# Patient Record
Sex: MALE | Race: WHITE | NOT HISPANIC OR LATINO | Employment: OTHER | URBAN - METROPOLITAN AREA
[De-identification: names, ages, dates, MRNs, and addresses within clinical notes are randomized per-mention and may not be internally consistent; named-entity substitution may affect disease eponyms.]

---

## 2018-09-10 ENCOUNTER — HOSPITAL ENCOUNTER (OUTPATIENT)
Facility: OTHER | Age: 73
Discharge: HOME | End: 2018-09-10
Attending: INTERNAL MEDICINE | Admitting: INTERNAL MEDICINE
Payer: MEDICARE

## 2018-09-10 VITALS
HEART RATE: 67 BPM | WEIGHT: 270 LBS | RESPIRATION RATE: 16 BRPM | SYSTOLIC BLOOD PRESSURE: 147 MMHG | DIASTOLIC BLOOD PRESSURE: 84 MMHG | TEMPERATURE: 98 F | OXYGEN SATURATION: 98 % | HEIGHT: 66 IN | BODY MASS INDEX: 43.39 KG/M2

## 2018-09-10 DIAGNOSIS — N18.6 ESRD (END STAGE RENAL DISEASE): ICD-10-CM

## 2018-09-10 PROCEDURE — 25000003 PHARM REV CODE 250

## 2018-09-10 PROCEDURE — 36589 REMOVAL TUNNELED CV CATH: CPT | Performed by: INTERNAL MEDICINE

## 2018-09-10 RX ORDER — LIDOCAINE HYDROCHLORIDE 10 MG/ML
INJECTION INFILTRATION; PERINEURAL
Status: DISCONTINUED | OUTPATIENT
Start: 2018-09-10 | End: 2018-09-13 | Stop reason: HOSPADM

## 2018-09-10 NOTE — H&P
[facility]  History & Physical    Subjective:      Chief Complaint/Reason for Admission: Here for TDC removal    Gilson Owens is a 73 y.o. male with ESRD (on HD TTS at [facility] with Dr. Jarquin) who presents today for tunneled dialysis catheter removal.  He has been using his RUE AVF for 2 weeks now without any issues.  Denies recent prolonged bleeding or issues with alarms.      Past Medical History:   Diagnosis Date    Diabetes mellitus     Hypertension     Kidney disease, chronic, end stage on dialysis     Sleep apnea      History reviewed. No pertinent surgical history.  History reviewed. No pertinent family history.  Social History     Tobacco Use    Smoking status: Former Smoker    Smokeless tobacco: Never Used   Substance Use Topics    Alcohol use: Yes     Alcohol/week: 1.2 oz     Types: 2 Glasses of wine per week     Comment: occasionally    Drug use: No       PTA Medications   Medication Sig    aspirin (ECOTRIN) 81 MG EC tablet Take 81 mg by mouth once daily.    clopidogrel (PLAVIX) 75 mg tablet Take 75 mg by mouth once daily.    doxazosin (CARDURA) 4 MG tablet Take 4 mg by mouth nightly.    furosemide (LASIX) 40 MG tablet Take 40 mg by mouth 2 (two) times daily.    insulin glargine (LANTUS) 100 unit/mL injection Inject into the skin every evening.    metoprolol tartrate (LOPRESSOR) 100 MG tablet Take 100 mg by mouth 2 (two) times daily.    ramipril (ALTACE) 1.25 MG capsule Take 1.25 mg by mouth once daily.    simvastatin (ZOCOR) 10 MG tablet Take 10 mg by mouth once daily.    glyBURIDE (DIABETA) 5 MG tablet Take 5 mg by mouth daily with breakfast.    hydroCHLOROthiazide (HYDRODIURIL) 25 MG tablet TAKE 1 TABLET BY MOUTH AS DIRECTED    NIFEdipine (ADALAT CC) 60 MG TbSR Take 60 mg by mouth 2 (two) times daily.    paricalcitol (ZEMPLAR) 1 MCG capsule Take 1 mcg by mouth once daily.    valsartan (DIOVAN) 80 MG tablet Take 80 mg by mouth once daily.     Review of  patient's allergies indicates:   Allergen Reactions    Shrimp         Review of Systems   Constitutional: Negative.    Respiratory: Negative.    Cardiovascular: Negative.        Objective:      Vital Signs (Most Recent)  Temp: 97.4 °F (36.3 °C) (09/10/18 0935)  Pulse: 96 (09/10/18 0935)  Resp: 14 (09/10/18 0935)  BP: (!) 161/72 (09/10/18 0935)  SpO2: 99 % (09/10/18 0935)    Vital Signs Range (Last 24H):  Temp:  [97.4 °F (36.3 °C)]   Pulse:  [96]   Resp:  [14]   BP: (161)/(72)   SpO2:  [99 %]     Physical Exam   Constitutional: He is oriented to person, place, and time. He appears well-developed and well-nourished. No distress.   HENT:   Head: Normocephalic and atraumatic.   Eyes: EOM are normal.   Neck: Neck supple.   Cardiovascular:   R IJ tunneled dialysis catheter, clean without erythema or exudate   Pulmonary/Chest: Effort normal. No respiratory distress.   Musculoskeletal:   RUE AVF with good thrill, non-pulsatile   Neurological: He is alert and oriented to person, place, and time.   Skin: Skin is warm and dry. He is not diaphoretic.   Psychiatric: He has a normal mood and affect. His behavior is normal.       Assessment:      There are no hospital problems to display for this patient.      Plan:      TDC removal today.  Risks explained, consent signed.

## 2018-09-10 NOTE — BRIEF OP NOTE
[facility]  Brief Operative Note     SUMMARY     Surgery Date: 9/10/2018     Surgeon(s) and Role:     * Ahsan Mendoza MD - Primary    Assisting Surgeon: None    Pre-op Diagnosis:  ESRD (end stage renal disease) [N18.6]    Post-op Diagnosis:  Post-Op Diagnosis Codes:     * ESRD (end stage renal disease) [N18.6]    Procedure(s) (LRB):  REMOVAL, CATHETER, CENTRAL VENOUS, TUNNELED (N/A)    Anesthesia: 1% lidocaine    Description of the findings of the procedure: Patient was prepped and draped in a sterile fashion.  This was a successful removal of his R IJ TDC.  Patient tolerated the procedure well and no immediate complications noted.    Findings/Key Components: Patient was prepped and draped in a sterile fashion.  This was a successful removal of his R IJ TDC.  Patient tolerated the procedure well and no immediate complications noted.      Estimated Blood Loss: < 10 mL         Specimens:   Specimen (12h ago, onward)    None          Discharge Note    SUMMARY     Admit Date: 9/10/2018    Discharge Date and Time:  09/10/2018 10:27 AM    Hospital Course (synopsis of major diagnoses, care, treatment, and services provided during the course of the hospital stay): Patient was prepped and draped in a sterile fashion.  This was a successful removal of his R IJ TDC.  Patient tolerated the procedure well and no immediate complications noted.       Final Diagnosis: Post-Op Diagnosis Codes:     * ESRD (end stage renal disease) [N18.6]    Disposition: Home or Self Care    Follow Up/Patient Instructions:     Medications:  Reconciled Home Medications:      Medication List      CONTINUE taking these medications    aspirin 81 MG EC tablet  Commonly known as:  ECOTRIN  Take 81 mg by mouth once daily.     clopidogrel 75 mg tablet  Commonly known as:  PLAVIX  Take 75 mg by mouth once daily.     doxazosin 4 MG tablet  Commonly known as:  CARDURA  Take 4 mg by mouth nightly.     furosemide 40 MG tablet  Commonly known  as:  LASIX  Take 40 mg by mouth 2 (two) times daily.     glyBURIDE 5 MG tablet  Commonly known as:  DIABETA  Take 5 mg by mouth daily with breakfast.     hydroCHLOROthiazide 25 MG tablet  Commonly known as:  HYDRODIURIL  TAKE 1 TABLET BY MOUTH AS DIRECTED     insulin glargine 100 unit/mL injection  Commonly known as:  LANTUS  Inject into the skin every evening.     metoprolol tartrate 100 MG tablet  Commonly known as:  LOPRESSOR  Take 100 mg by mouth 2 (two) times daily.     NIFEdipine 60 MG Tbsr  Commonly known as:  ADALAT CC  Take 60 mg by mouth 2 (two) times daily.     paricalcitol 1 MCG capsule  Commonly known as:  ZEMPLAR  Take 1 mcg by mouth once daily.     ramipril 1.25 MG capsule  Commonly known as:  ALTACE  Take 1.25 mg by mouth once daily.     simvastatin 10 MG tablet  Commonly known as:  ZOCOR  Take 10 mg by mouth once daily.     valsartan 80 MG tablet  Commonly known as:  DIOVAN  Take 80 mg by mouth once daily.          Discharge Procedure Orders   Call MD for:  temperature >100.4     Call MD for:  severe uncontrolled pain     Call MD for:  redness, tenderness, or signs of infection (pain, swelling, redness, odor or green/yellow discharge around incision site)     Call MD for:   Order Comments: Bleeding from the access site.     Activity as tolerated     Shower on day dressing removed (No bath)     Follow-up Information     [facility] In 1 day.    Why:  for dialysis  Contact information:  Cailin Multani  [facility] [id number] [phone]

## 2018-09-10 NOTE — PLAN OF CARE
D/c instructions given, pt able to ambulate with cane   to elevator , friend with pt . No complaints or distress

## 2018-11-12 ENCOUNTER — TELEPHONE (OUTPATIENT)
Dept: TRANSPLANT | Facility: CLINIC | Age: 73
End: 2018-11-12

## 2019-01-03 DIAGNOSIS — Z76.82 ORGAN TRANSPLANT CANDIDATE: Primary | ICD-10-CM

## 2019-01-16 ENCOUNTER — OFFICE VISIT (OUTPATIENT)
Dept: TRANSPLANT | Facility: CLINIC | Age: 74
End: 2019-01-16
Payer: MEDICARE

## 2019-01-16 ENCOUNTER — LAB VISIT (OUTPATIENT)
Dept: LAB | Facility: HOSPITAL | Age: 74
End: 2019-01-16
Attending: NURSE PRACTITIONER
Payer: MEDICARE

## 2019-01-16 VITALS
BODY MASS INDEX: 39.69 KG/M2 | HEART RATE: 63 BPM | DIASTOLIC BLOOD PRESSURE: 51 MMHG | HEIGHT: 66 IN | OXYGEN SATURATION: 96 % | RESPIRATION RATE: 18 BRPM | WEIGHT: 246.94 LBS | TEMPERATURE: 98 F | SYSTOLIC BLOOD PRESSURE: 104 MMHG

## 2019-01-16 DIAGNOSIS — I48.91 ATRIAL FIBRILLATION, UNSPECIFIED TYPE: ICD-10-CM

## 2019-01-16 DIAGNOSIS — I12.0 BENIGN HYPERTENSION WITH ESRD (END-STAGE RENAL DISEASE): ICD-10-CM

## 2019-01-16 DIAGNOSIS — Z76.82 ORGAN TRANSPLANT CANDIDATE: ICD-10-CM

## 2019-01-16 DIAGNOSIS — N18.6 BENIGN HYPERTENSION WITH ESRD (END-STAGE RENAL DISEASE): ICD-10-CM

## 2019-01-16 DIAGNOSIS — N18.6 CONTROLLED TYPE 2 DIABETES MELLITUS WITH CHRONIC KIDNEY DISEASE ON CHRONIC DIALYSIS, UNSPECIFIED WHETHER LONG TERM INSULIN USE: ICD-10-CM

## 2019-01-16 DIAGNOSIS — D63.1 ANEMIA IN ESRD (END-STAGE RENAL DISEASE): ICD-10-CM

## 2019-01-16 DIAGNOSIS — I73.9 PVD (PERIPHERAL VASCULAR DISEASE): ICD-10-CM

## 2019-01-16 DIAGNOSIS — Z79.01 CURRENT USE OF LONG TERM ANTICOAGULATION: ICD-10-CM

## 2019-01-16 DIAGNOSIS — I15.0 RENOVASCULAR HYPERTENSION: ICD-10-CM

## 2019-01-16 DIAGNOSIS — Z01.818 PRE-TRANSPLANT EVALUATION FOR CHRONIC KIDNEY DISEASE: Primary | ICD-10-CM

## 2019-01-16 DIAGNOSIS — N18.6 ANEMIA IN ESRD (END-STAGE RENAL DISEASE): ICD-10-CM

## 2019-01-16 DIAGNOSIS — Z99.2 CONTROLLED TYPE 2 DIABETES MELLITUS WITH CHRONIC KIDNEY DISEASE ON CHRONIC DIALYSIS, UNSPECIFIED WHETHER LONG TERM INSULIN USE: ICD-10-CM

## 2019-01-16 DIAGNOSIS — I25.10 CORONARY ARTERY DISEASE INVOLVING NATIVE CORONARY ARTERY OF NATIVE HEART WITHOUT ANGINA PECTORIS: ICD-10-CM

## 2019-01-16 DIAGNOSIS — E11.22 CONTROLLED TYPE 2 DIABETES MELLITUS WITH CHRONIC KIDNEY DISEASE ON CHRONIC DIALYSIS, UNSPECIFIED WHETHER LONG TERM INSULIN USE: ICD-10-CM

## 2019-01-16 LAB
A1 AG RBC QL: NORMAL
ABO + RH BLD: NORMAL
ALBUMIN SERPL BCP-MCNC: 3.2 G/DL
ALP SERPL-CCNC: 109 U/L
ALT SERPL W/O P-5'-P-CCNC: 20 U/L
ANION GAP SERPL CALC-SCNC: 14 MMOL/L
APTT BLDCRRT: 25.5 SEC
AST SERPL-CCNC: 12 U/L
BASOPHILS # BLD AUTO: 0.07 K/UL
BASOPHILS NFR BLD: 0.6 %
BILIRUB DIRECT SERPL-MCNC: 0.2 MG/DL
BILIRUB SERPL-MCNC: 0.6 MG/DL
BLD GP AB SCN CELLS X3 SERPL QL: NORMAL
BUN SERPL-MCNC: 60 MG/DL
CALCIUM SERPL-MCNC: 9.5 MG/DL
CHLORIDE SERPL-SCNC: 96 MMOL/L
CHOLEST SERPL-MCNC: 151 MG/DL
CHOLEST/HDLC SERPL: 4 {RATIO}
CO2 SERPL-SCNC: 28 MMOL/L
COMPLEXED PSA SERPL-MCNC: 2.9 NG/ML
CREAT SERPL-MCNC: 9.6 MG/DL
DIFFERENTIAL METHOD: ABNORMAL
EOSINOPHIL # BLD AUTO: 0.1 K/UL
EOSINOPHIL NFR BLD: 1.2 %
ERYTHROCYTE [DISTWIDTH] IN BLOOD BY AUTOMATED COUNT: 13.6 %
EST. GFR  (AFRICAN AMERICAN): 5.6 ML/MIN/1.73 M^2
EST. GFR  (NON AFRICAN AMERICAN): 4.9 ML/MIN/1.73 M^2
ESTIMATED AVG GLUCOSE: 100 MG/DL
GLUCOSE SERPL-MCNC: 97 MG/DL
HBA1C MFR BLD HPLC: 5.1 %
HBV CORE AB SERPL QL IA: POSITIVE
HBV SURFACE AG SERPL QL IA: NEGATIVE
HCT VFR BLD AUTO: 45.5 %
HCV AB SERPL QL IA: NEGATIVE
HDLC SERPL-MCNC: 38 MG/DL
HDLC SERPL: 25.2 %
HGB BLD-MCNC: 14.7 G/DL
HIV 1+2 AB+HIV1 P24 AG SERPL QL IA: NEGATIVE
IMM GRANULOCYTES # BLD AUTO: 0.06 K/UL
IMM GRANULOCYTES NFR BLD AUTO: 0.5 %
INR PPP: 1
LDLC SERPL CALC-MCNC: 87 MG/DL
LYMPHOCYTES # BLD AUTO: 2.5 K/UL
LYMPHOCYTES NFR BLD: 22.4 %
MCH RBC QN AUTO: 32.4 PG
MCHC RBC AUTO-ENTMCNC: 32.3 G/DL
MCV RBC AUTO: 100 FL
MONOCYTES # BLD AUTO: 0.9 K/UL
MONOCYTES NFR BLD: 8.1 %
NEUTROPHILS # BLD AUTO: 7.4 K/UL
NEUTROPHILS NFR BLD: 67.2 %
NONHDLC SERPL-MCNC: 113 MG/DL
NRBC BLD-RTO: 0 /100 WBC
PHOSPHATE SERPL-MCNC: 6.6 MG/DL
PLATELET # BLD AUTO: 260 K/UL
PMV BLD AUTO: 9.6 FL
POTASSIUM SERPL-SCNC: 4.3 MMOL/L
PROT SERPL-MCNC: 8.3 G/DL
PROTHROMBIN TIME: 10.1 SEC
PTH-INTACT SERPL-MCNC: 614 PG/ML
RBC # BLD AUTO: 4.54 M/UL
SODIUM SERPL-SCNC: 138 MMOL/L
TRIGL SERPL-MCNC: 130 MG/DL
WBC # BLD AUTO: 11.08 K/UL

## 2019-01-16 PROCEDURE — 84100 ASSAY OF PHOSPHORUS: CPT | Mod: TXP

## 2019-01-16 PROCEDURE — 86825 HLA X-MATH NON-CYTOTOXIC: CPT | Mod: 91,PO,TXP

## 2019-01-16 PROCEDURE — 99204 PR OFFICE/OUTPT VISIT, NEW, LEVL IV, 45-59 MIN: ICD-10-PCS | Mod: S$PBB,,, | Performed by: NURSE PRACTITIONER

## 2019-01-16 PROCEDURE — 86787 VARICELLA-ZOSTER ANTIBODY: CPT | Mod: TXP

## 2019-01-16 PROCEDURE — 86480 TB TEST CELL IMMUN MEASURE: CPT | Mod: TXP

## 2019-01-16 PROCEDURE — 86704 HEP B CORE ANTIBODY TOTAL: CPT | Mod: TXP

## 2019-01-16 PROCEDURE — 86829 HLA CLASS I/II ANTIBODY QUAL: CPT | Mod: PO,TXP

## 2019-01-16 PROCEDURE — 36415 COLL VENOUS BLD VENIPUNCTURE: CPT | Mod: TXP

## 2019-01-16 PROCEDURE — 86828 HLA CLASS I&II ANTIBODY QUAL: CPT | Mod: 91,PO,TXP

## 2019-01-16 PROCEDURE — 86825 HLA X-MATH NON-CYTOTOXIC: CPT | Mod: PO,TXP

## 2019-01-16 PROCEDURE — 83970 ASSAY OF PARATHORMONE: CPT | Mod: TXP

## 2019-01-16 PROCEDURE — 99204 OFFICE O/P NEW MOD 45 MIN: CPT | Mod: S$PBB,,, | Performed by: NURSE PRACTITIONER

## 2019-01-16 PROCEDURE — 85730 THROMBOPLASTIN TIME PARTIAL: CPT | Mod: TXP

## 2019-01-16 PROCEDURE — 99999 PR PBB SHADOW E&M-EST. PATIENT-LVL IV: CPT | Mod: PBBFAC,TXP,, | Performed by: NURSE PRACTITIONER

## 2019-01-16 PROCEDURE — 80061 LIPID PANEL: CPT | Mod: TXP

## 2019-01-16 PROCEDURE — 87340 HEPATITIS B SURFACE AG IA: CPT | Mod: TXP

## 2019-01-16 PROCEDURE — 86706 HEP B SURFACE ANTIBODY: CPT | Mod: TXP

## 2019-01-16 PROCEDURE — 83036 HEMOGLOBIN GLYCOSYLATED A1C: CPT | Mod: TXP

## 2019-01-16 PROCEDURE — 99999 PR PBB SHADOW E&M-EST. PATIENT-LVL IV: ICD-10-PCS | Mod: PBBFAC,TXP,, | Performed by: NURSE PRACTITIONER

## 2019-01-16 PROCEDURE — 84153 ASSAY OF PSA TOTAL: CPT | Mod: TXP

## 2019-01-16 PROCEDURE — 99214 OFFICE O/P EST MOD 30 MIN: CPT | Mod: PBBFAC,TXP | Performed by: NURSE PRACTITIONER

## 2019-01-16 PROCEDURE — 86703 HIV-1/HIV-2 1 RESULT ANTBDY: CPT | Mod: TXP

## 2019-01-16 PROCEDURE — 85025 COMPLETE CBC W/AUTO DIFF WBC: CPT | Mod: TXP

## 2019-01-16 PROCEDURE — 82248 BILIRUBIN DIRECT: CPT | Mod: TXP

## 2019-01-16 PROCEDURE — 86905 BLOOD TYPING RBC ANTIGENS: CPT | Mod: TXP

## 2019-01-16 PROCEDURE — 86803 HEPATITIS C AB TEST: CPT | Mod: TXP

## 2019-01-16 PROCEDURE — 86850 RBC ANTIBODY SCREEN: CPT | Mod: TXP

## 2019-01-16 PROCEDURE — 85610 PROTHROMBIN TIME: CPT | Mod: TXP

## 2019-01-16 PROCEDURE — 80053 COMPREHEN METABOLIC PANEL: CPT | Mod: TXP

## 2019-01-16 PROCEDURE — 86644 CMV ANTIBODY: CPT | Mod: TXP

## 2019-01-16 PROCEDURE — 86592 SYPHILIS TEST NON-TREP QUAL: CPT | Mod: TXP

## 2019-01-16 RX ORDER — VITAMIN B COMPLEX
1 CAPSULE ORAL DAILY
COMMUNITY
End: 2019-01-16

## 2019-01-16 RX ORDER — SEVELAMER CARBONATE 800 MG/1
1600 TABLET, FILM COATED ORAL 4 TIMES DAILY
COMMUNITY

## 2019-01-16 RX ORDER — ROPINIROLE 0.25 MG/1
0.5 TABLET, FILM COATED ORAL DAILY
COMMUNITY

## 2019-01-16 RX ORDER — CINACALCET 30 MG/1
30 TABLET, FILM COATED ORAL NIGHTLY
COMMUNITY
End: 2019-01-16 | Stop reason: ALTCHOICE

## 2019-01-16 RX ORDER — ROPINIROLE 2 MG/1
2 TABLET, FILM COATED ORAL 2 TIMES DAILY
COMMUNITY

## 2019-01-16 RX ORDER — GLUCOSAMINE/CHONDRO SU A 500-400 MG
2 TABLET ORAL DAILY
COMMUNITY

## 2019-01-16 RX ORDER — AMIODARONE HYDROCHLORIDE 200 MG/1
200 TABLET ORAL 2 TIMES DAILY
COMMUNITY

## 2019-01-16 RX ORDER — OMEGA-3-ACID ETHYL ESTERS 1 G/1
1 CAPSULE, LIQUID FILLED ORAL DAILY
COMMUNITY

## 2019-01-16 NOTE — PROGRESS NOTES
Transplant Nephrology  Kidney Transplant Recipient Evaluation    Referring Physician: Gabriel Jarquin  Current Nephrologist: Gabriel Jarquin    Subjective:   CC:  Initial evaluation of kidney transplant candidacy.    HPI:  Mr. Owens is a 73 y.o. year old White male who has presented to be evaluated as a potential kidney transplant recipient.  He has ESRD secondary to diabetic nephropathy and HTN.  Patient was initially started on PD in 5/2018 and switched over to  HD bc the catheter did not work properly. Patient is dialyzing on TTS schedule. Dialyzes for 3 1/2 hours. BP  Will drop from 130/ 75 to 110/60s. He is  No longer on BP meds. Patient reports that he is tolerating dialysis well.. He has a RUE AV fistula for dialysis access.     Previous Transplant: no     Past Medical and Surgical History: Mr. Owens  has a past medical history of Anemia, Atrial fibrillation, C. difficile colitis, Cataract, Coronary artery disease, Diabetes mellitus, Encounter for blood transfusion, Hyperlipidemia, Hypertension, Kidney disease, chronic, end stage on dialysis, Kidney stones, Restless leg, Sleep apnea, Sleep apnea, and Venous insufficiency.  He has a past surgical history that includes removal of tunneled central venous catheter (cvc) (N/A, 9/10/2018); Eye surgery; leg stents; Peritoneal catheter insertion; and Cataract extraction, bilateral.    Past Social and Family History: Mr. Owens reports that he has quit smoking. he has never used smokeless tobacco. He reports that he drinks about 1.2 oz of alcohol per week. He reports that he does not use drugs. His family history is not on file.    colonoscopy 4/2018     Leg stents x2 on the right and left leg sometime b/w [id number] Dr Lora/ vascular  Venous insufficiency , which better since starting dialysis    Dr Jarquin is managing this. Does not have a cardiologist   CAD, A fib  On Plavix , amiodarone    DM2 --diagnosed in late 40s, early 50s  On insulin starting ~ 2011  Has  some peripheral neuropathy  Denies gastroparesis     Kidney BX-no  Donors--no     Sleep Apnea -sleeps in a reclining  chair  Didn't tolerate th e CPAP      FX assessment: HX restless leg syndrome  Has balance problems and will use a Cane. Denies falls. Does not use the cane in the house.   He has stairs at his house. Was in PT in 7/2018 after hospitalization from kidney stone and sepsis.   He denies chest pain, claudication or SOB  Does some exercises daily the the Physical therapist gave him.   Enjoys working in the garden but doesn't do it as much as he use to.       Past Medical History:   Diagnosis Date    Anemia     Atrial fibrillation     C. difficile colitis     Cataract     Coronary artery disease     Diabetes mellitus     Encounter for blood transfusion     Hyperlipidemia     Hypertension     Kidney disease, chronic, end stage on dialysis     Kidney stones     Restless leg     Sleep apnea     Sleep apnea     Venous insufficiency        Review of Systems   Constitutional: Negative for activity change, appetite change, chills, fatigue, fever and unexpected weight change.   HENT: Negative for congestion, facial swelling, postnasal drip, rhinorrhea, sinus pressure, sore throat and trouble swallowing.         Seasonal sinus problems    Eyes: Positive for visual disturbance. Negative for pain and redness.        Wears reading glasses   Respiratory: Negative for cough, chest tightness, shortness of breath and wheezing.    Cardiovascular: Positive for leg swelling. Negative for chest pain and palpitations.   Gastrointestinal: Negative for abdominal pain, diarrhea, nausea and vomiting.        Obese abdomen    Genitourinary: Positive for decreased urine volume. Negative for dysuria, flank pain and urgency.        1/4 cup per day   Musculoskeletal: Positive for gait problem. Negative for neck pain and neck stiffness.        Uses a cane    Skin: Negative for rash.   Allergic/Immunologic: Negative for  "environmental allergies, food allergies and immunocompromised state.   Neurological: Negative for dizziness, weakness, light-headedness and headaches.        Peripheral neuropathy   Hematological: Bruises/bleeds easily.        PLavix   Psychiatric/Behavioral: Negative for agitation and confusion. The patient is not nervous/anxious.        Objective:   Blood pressure (!) 104/51, pulse 63, temperature 98 °F (36.7 °C), temperature source Oral, resp. rate 18, height 5' 5.75" (1.67 m), weight 112 kg (246 lb 14.6 oz), SpO2 96 %.body mass index is 40.16 kg/m².    Physical Exam   Constitutional: He is oriented to person, place, and time. He appears well-developed and well-nourished.   HENT:   Head: Normocephalic.   Mouth/Throat: Oropharynx is clear and moist. No oropharyngeal exudate.   Eyes: Conjunctivae and EOM are normal. Pupils are equal, round, and reactive to light. No scleral icterus.   Neck: Normal range of motion. Neck supple.   Cardiovascular: Normal rate, regular rhythm and normal heart sounds.   Pulmonary/Chest: Effort normal and breath sounds normal.   Abdominal: Soft. Normal appearance and bowel sounds are normal. He exhibits no distension and no mass. There is no splenomegaly or hepatomegaly. There is no tenderness. There is no rebound, no guarding, no CVA tenderness, no tenderness at McBurney's point and negative Scott's sign.       Non tender, reducible   Musculoskeletal: Normal range of motion. He exhibits edema.        Arms:  Bilateral trace peripheral edema   Lymphadenopathy:     He has no cervical adenopathy.   Neurological: He is alert and oriented to person, place, and time. He exhibits normal muscle tone. Coordination normal.   Skin: Skin is warm and dry.   Lower extremity dark purple/erythematous  discoloration to the  shins bilaterally   ecchymotic  discoloration to the dorsal surface of the hands bilaterally .     Psychiatric: He has a normal mood and affect. His behavior is normal.   Vitals " "reviewed.      Labs:  Lab Results   Component Value Date    WBC 11.66 (H) 02/04/2010    HGB 13.4 (L) 02/04/2010    HCT 39.1 (L) 02/04/2010     07/19/2010    K 3.4 (L) 07/19/2010     07/19/2010    CO2 25 07/19/2010    BUN 38 (H) 07/19/2010    CREATININE 1.9 (H) 07/19/2010    EGFRNONAA 36 (A) 07/19/2010    CALCIUM 10.0 07/19/2010    ALBUMIN 3.2 (L) 07/19/2010    AST 15 07/19/2010    ALT 25 07/19/2010       No results found for: PREALBUMIN, BILIRUBINUA, GGT, AMYLASE, LIPASE, PROTEINUA, NITRITE, RBCUA, WBCUA    No results found for: HLAABCTYPE    Labs were reviewed with the patient.    Assessment:     1. Pre-transplant evaluation for chronic kidney disease    2. Benign hypertension with ESRD (end-stage renal disease)    3. Coronary artery disease involving native coronary artery of native heart without angina pectoris    4. Renovascular hypertension    5. Anemia in ESRD (end-stage renal disease)    6. PVD (peripheral vascular disease)    7. BMI 40.0-44.9, adult    8. Controlled type 2 diabetes mellitus with chronic kidney disease on chronic dialysis, unspecified whether long term insulin use    9. Atrial fibrillation, unspecified type    10. Current use of long term anticoagulation        Plan:   Discussed with Dr Pyle : Pt deferred d/t being high risk with -->BMI > 40.0, HX CAD, diabetes, PVD/ with bilateral "leg stents",  on anticoagulation (plavix),  gait instability, as well as,  advanced age without donors.     Transplant Candidacy:   Based on available information, Mr. Owens is an unacceptable kidney transplant candidate.   Meets center eligibility for accepting HCV+ donor offer - yes.  Patient educated on HCV+ donors. Gilson is willing to accept HCV+ donor offer - N/A  Patient is a candidate for KDPI > 85 kidney donor offer - no.D/T weight and anticoagulation   Final determination of transplant candidacy will be made once workup is complete and reviewed by the selection committee.    Corine Street, " NP       UNOS Patient Status  Functional Status: 60% - Requires occasional assistance but is able to care for needs  Physical Capacity: No Limitations

## 2019-01-16 NOTE — PROGRESS NOTES
PHARM.D. PRE-TRANSPLANT NOTE:    This patient's medication therapy was evaluated as part of his pre-transplant evaluation.    The following pharmacologic concerns were noted: Patient on amiodarone    This patient's medication profile was reviewed for contraindications for DAA Hepatitis C therapy:    [X]  No current inducers of CYP 3A4 or PGP  [YES]  Amiodarone on this patient's EMR profile in the last 24 months  [YES]  Past or current atrial fibrillation on this patient's EMR profile       Current Outpatient Medications   Medication Sig Dispense Refill    amiodarone (PACERONE) 200 MG Tab Take 200 mg by mouth 2 (two) times daily.      aspirin (ECOTRIN) 81 MG EC tablet Take 81 mg by mouth once daily.      b complex vitamins capsule Take 1 capsule by mouth once daily.      cinacalcet (SENSIPAR) 30 MG Tab Take 30 mg by mouth every evening.      coenzyme Q10 300 mg Cap Take 300 mg by mouth once daily.      furosemide (LASIX) 40 MG tablet Take 40 mg by mouth. 4 days a week.      glucosamine-chondroitin 500-400 mg tablet Take 2 tablets by mouth once daily.      hydroCHLOROthiazide (HYDRODIURIL) 25 MG tablet TAKE 1 TABLET BY MOUTH AS DIRECTED  2    insulin glargine (LANTUS) 100 unit/mL injection Inject 25 Units into the skin every evening.       omega-3 acid ethyl esters (LOVAZA) 1 gram capsule Take 1 g by mouth once daily.      paricalcitol (ZEMPLAR) 1 MCG capsule Take 1 mcg by mouth once daily.      rOPINIRole (REQUIP) 0.25 MG tablet Take 0.5 mg by mouth once daily.      rOPINIRole (REQUIP) 2 MG tablet Take 2 mg by mouth 2 (two) times daily.      sevelamer carbonate (RENVELA) 800 mg Tab Take 1,600 mg by mouth 4 (four) times daily.      valsartan (DIOVAN) 80 MG tablet Take 80 mg by mouth once daily.  1     No current facility-administered medications for this visit.          Currently the patient is responsible for preparing / administering this patient's medications on a daily basis.  I am available for  consultation and can be contacted, as needed by the other members of the Kidney Transplant team.

## 2019-01-16 NOTE — LETTER
January 16, 2019        Gabriel Jarquin  [address] [facility] [address]  Phone: [phone]  Fax: [phone]             [facility]  [address]  [facility] [address]  Phone: [phone]   Patient: Gilson Owens   MR Number: [id number]   YOB: 1945   Date of Visit: 1/16/2019       Dear Dr. Gabriel Jarquin    Thank you for referring Gilson Owens to me for evaluation. Attached you will find relevant portions of my assessment and plan of care.    If you have questions, please do not hesitate to call me. I look forward to following Gilson Owens along with you.    Sincerely,    Corine Street, NP    Enclosure    If you would like to receive this communication electronically, please contact [email or web address] or [phone] to request [employer] access.    [employer] is a tool which provides read-only access to select patient information with whom you have a relationship. Its easy to use and provides real time access to review your patients record including encounter summaries, notes, results, and demographic information.    If you feel you have received this communication in error or would no longer like to receive these types of communications, please e-mail [email or web address]

## 2019-01-16 NOTE — PROGRESS NOTES
INITIAL PATIENT EDUCATION NOTE    Mr. Gilson Owens was seen in pre-kidney transplant clinic for evaluation for kidney, kidney/pancreas or pancreas only transplant.  The patient attended a group education session that discussed/reviewed the following aspects of transplantation: evaluation and selection committee process, UNOS waitlist management/multiple listings, types of organs offered (KDPI < 85%, KDPI > 85%, PHS increased risk, DCD, HCV+), financial aspects, surgical procedures, dietary instruction pre- and post-transplant, health maintenance pre- and post-transplant, post-transplant hospitalization and outpatient follow-up, potential to participate in a research protocol, and medication management and side effects.  A question and answer session was provided after the presentation.    The patient was seen by all members of the multi-disciplinary team to include: Nephrologist/PA, Surgeon, , Transplant Coordinator, , Pharmacist and Dietician (if applicable).    The patient reviewed and signed all consents for evaluation which were witnessed and sent to scanning into the EPIC chart.    The patient was given an education book and plan for further evaluation based on his individual assessment.      The patient was encouraged to call with any questions or concerns.

## 2019-01-17 LAB
HBV SURFACE AB SER QL IA: POSITIVE
HBV SURFACE AB SERPL IA-ACNC: 97 MIU/ML
RPR SER QL: NORMAL
STRONGYLOIDES ANTIBODY IGG: NEGATIVE
VARICELLA INTERPRETATION: POSITIVE
VARICELLA ZOSTER IGG: 3.34 ISR

## 2019-01-18 ENCOUNTER — COMMITTEE REVIEW (OUTPATIENT)
Dept: TRANSPLANT | Facility: CLINIC | Age: 74
End: 2019-01-18

## 2019-01-18 ENCOUNTER — TELEPHONE (OUTPATIENT)
Dept: TRANSPLANT | Facility: CLINIC | Age: 74
End: 2019-01-18

## 2019-01-18 LAB
CMV IGG SERPL QL IA: REACTIVE
M TB IFN-G CD4+ BCKGRND COR BLD-ACNC: -0.01 IU/ML
MITOGEN IGNF BCKGRD COR BLD-ACNC: 8.06 IU/ML
MITOGEN IGNF BCKGRD COR BLD-ACNC: NEGATIVE [IU]/ML
NIL: 0.04 IU/ML
TB2 - NIL: -0.01 IU/ML

## 2019-01-18 NOTE — LETTER
January 18, 2019    Gilson Owens  [address] [facility] [address]        Dear Gilson Owens:  MRN: [id number]    It is the duty of the [facility] Selection [facility] to determine which patients are candidates for a transplant. For this reason, our committee has the difficult task of evaluating patients to determine which ones have the greatest chance of having a successful transplant. We are aware of the magnitude of this responsibility, and we approach it with reverence and humility.    It is with regret I inform you that you are not approved as a transplant candidate due to a BMI greater than 40,  coronary artery disease, peripheral vascular disease with bilateral stents, on Plavix, gait instability, and advanced age without la living donor. Based on this review, we have determined that at this time, you are not a candidate for a transplant at [facility].      The selection committee carefully considers each patient's transplant candidacy and determines whether it is safe to proceed with transplantation on a case-by-case basis using established selection criteria.  At present, the risk of proceeding with an elective transplant surgery has become too high.                                                                               Although the selection committee believes you are not a suitable transplant candidate, you have the option to be evaluated at other transplant centers who may have different selection criteria.  You may request your [facility] records be sent to any center of your choice by contacting our Medical Records Department at [phone].                                                                               Attached is a letter from the [employer] Network for Organ Sharing (UNOS).  It describes the services and information offered to patients by UNOS and the Organ Procurement and Transplant Network.    The [facility] sincerely wishes you the best and  remains available to answer any questions.  Please do not hesitate to contact our pre-transplant office if we can assist you in any other way.                                                                               Sincerely,      Rupinder Jose MD  Medical Director, Kidney & Kidney/Pancreas Transplantation    Faxed to:  Dr. Gabriel Reilly [facility]    Encl: UNOS Letter          OPTN/UNOS: Your Resource for Organ Transplant Information        If you have a question regarding your own medical care, you always should call your transplant center first. However, for general organ transplant-related information, you can call the [employer] for Organ Sharing (UNOS) toll-free patient services line at [phone].    Anyone, including potential transplant candidates, recipients, family members/friends, living donors, and/or donor family members can call this number to:    · talk about organ donation, living donation, how transplant and donation work, the donation process, transplant policies, and transplant/donor information;  · get a free patient information kit with helpful booklets, waiting list and transplant information, and a list of all transplant centers;  · ask questions about the Organ Procurement and Transplantation Network (OPTN) web site (www.optn.transplant.hrsa.gov); the UNOS Web site (www.unos.org); or the UNOS web site for living donors and transplant recipients (www.transplantliving.org);  · learn how UNOS and the OPTN can help you;  · talk about any concerns that you may have with a transplant center and how they perform    UNOS is a not-for-profit organization that provides all of the administrative services for the national OPTN under federal contract to the Health Resources and Services Administration (HRSA), an agency under the U.S. Department of Health and Human Services (HHS).     UNOS and OPTN responsibilities include:    · writing  educational material for patients, the public and professionals;  · helping to make people aware of the need for donated organs and tissue;  · writing organ transplant policy with help from doctors, nurses, transplant patients/candidates, donor families, living donors, and the public;  · coordinating the organ matching and placement process;  · collecting information about every organ transplant and donation that occurs in the [place].    Remember, you should contact your transplant center directly if you have questions or concerns about your own medical care including medical records, work-up progress and test reports. [facility] is not your transplant center, and staff at [facility] will not be able to transfer you to your transplant center, so keep your transplant centers phone number handy. But, while you research your transplant needs and learn as much as you can about transplantation and donation, we welcome your call to our toll-free patient services line at [phone].

## 2019-01-18 NOTE — COMMITTEE REVIEW
"Native Organ Dx: Diabetes Mellitus - Type Other / Unknown      Not approved for LRD/CAD transplant due to BMI > 40.0, HX CAD, diabetes, PVD/ with bilateral "leg stents",  on anticoagulation (plavix),  gait instability, as well as,  advanced age without donors.     Attempt made to contact patient. No answer.     Note written by: Mirna Galicia RN     ===============================================    I was present at the meeting and attest to the decision of the committee  "

## 2019-01-23 PROCEDURE — 81373 HLA I TYPING 1 LOCUS LR: CPT | Mod: PO

## 2019-01-23 PROCEDURE — 81373 HLA I TYPING 1 LOCUS LR: CPT | Mod: 91,PO

## 2019-01-23 PROCEDURE — 81376 HLA II TYPING 1 LOCUS LR: CPT | Mod: 91,PO

## 2019-01-23 PROCEDURE — 81376 HLA II TYPING 1 LOCUS LR: CPT | Mod: PO

## 2019-01-25 LAB
HLA DRB4 1: NORMAL
HLA SSO DNA TYPING CLASS I & II INTERPRETATION: NORMAL
HLA-A 1 SERO. EQUIV: 2
HLA-A 1: NORMAL
HLA-A 2 SERO. EQUIV: 29
HLA-A 2: NORMAL
HLA-B 1 SERO. EQUIV: 44
HLA-B 1: NORMAL
HLA-B 2 SERO. EQUIV: 51
HLA-B 2: NORMAL
HLA-BW 1 SERO. EQUIV: 4
HLA-BW 2 SERO. EQUIV: NORMAL
HLA-C 1: NORMAL
HLA-C 2: NORMAL
HLA-CW 1 SERO. EQUIV: 14
HLA-CW 2 SERO. EQUIV: 16
HLA-DQ 1 SERO. EQUIV: 2
HLA-DQ 2 SERO. EQUIV: 5
HLA-DQB1 1: NORMAL
HLA-DQB1 2: NORMAL
HLA-DRB1 1 SERO. EQUIV: 1
HLA-DRB1 1: NORMAL
HLA-DRB1 2 SERO. EQUIV: 7
HLA-DRB1 2: NORMAL
HLA-DRB3 1: NORMAL
HLA-DRB3 2: NORMAL
HLA-DRB345 1 SERO. EQUIV: 53
HLA-DRB345 2 SERO. EQUIV: NORMAL
HLA-DRB4 2: NORMAL
HLA-DRB5 1: NORMAL
HLA-DRB5 2: NORMAL
SSDQB TESTING DATE: NORMAL
SSDRB TESTING DATE: NORMAL
SSOA TESTING DATE: NORMAL
SSOB TESTING DATE: NORMAL
SSOC TESTING DATE: NORMAL
SSODR TESTING DATE: NORMAL
TYSSO TESTING DATE: NORMAL

## 2019-02-05 LAB
B CELL RESULTS - XM AUTO: NEGATIVE
B MCS AVERAGE - XM AUTO: -28
FXMAU TESTING DATE: NORMAL
HLA AB QL: NEGATIVE
HLA AB SERPL: NEGATIVE
HLATY INTERPRETATION: NORMAL
SCRFL TESTING DATE: NORMAL
SERUM COLLECTION DT - XM AUTO: NORMAL
SERUM COLLECTION DT: NORMAL
T CELL RESULTS - XM AUTO: NEGATIVE
T MCS AVERAGE - XM AUTO: -14.5

## 2019-02-18 ENCOUNTER — HOSPITAL ENCOUNTER (OUTPATIENT)
Facility: OTHER | Age: 74
Discharge: HOME | End: 2019-02-18
Attending: INTERNAL MEDICINE | Admitting: INTERNAL MEDICINE
Payer: MEDICARE

## 2019-02-18 VITALS
SYSTOLIC BLOOD PRESSURE: 124 MMHG | HEART RATE: 64 BPM | OXYGEN SATURATION: 98 % | WEIGHT: 238 LBS | DIASTOLIC BLOOD PRESSURE: 64 MMHG | TEMPERATURE: 98 F | HEIGHT: 69 IN | BODY MASS INDEX: 35.25 KG/M2 | RESPIRATION RATE: 18 BRPM

## 2019-02-18 DIAGNOSIS — T82.590A MECHANICAL COMPLICATION OF ARTERIOVENOUS FISTULA SURGICALLY CREATED, INITIAL ENCOUNTER: Primary | ICD-10-CM

## 2019-02-18 DIAGNOSIS — N18.6 ESRD (END STAGE RENAL DISEASE): ICD-10-CM

## 2019-02-18 DIAGNOSIS — T82.898A PROBLEM WITH DIALYSIS ACCESS: ICD-10-CM

## 2019-02-18 LAB — POCT GLUCOSE: 96 MG/DL (ref 70–110)

## 2019-02-18 PROCEDURE — 36901 INTRO CATH DIALYSIS CIRCUIT: CPT | Performed by: INTERNAL MEDICINE

## 2019-02-18 PROCEDURE — 25000003 PHARM REV CODE 250: Performed by: INTERNAL MEDICINE

## 2019-02-18 PROCEDURE — 25500020 PHARM REV CODE 255: Performed by: INTERNAL MEDICINE

## 2019-02-18 PROCEDURE — 63600175 PHARM REV CODE 636 W HCPCS: Performed by: INTERNAL MEDICINE

## 2019-02-18 PROCEDURE — 82962 GLUCOSE BLOOD TEST: CPT | Performed by: INTERNAL MEDICINE

## 2019-02-18 PROCEDURE — C1894 INTRO/SHEATH, NON-LASER: HCPCS | Performed by: INTERNAL MEDICINE

## 2019-02-18 RX ORDER — HEPARIN SOD,PORCINE/0.9 % NACL 1000/500ML
INTRAVENOUS SOLUTION INTRAVENOUS
Status: DISCONTINUED | OUTPATIENT
Start: 2019-02-18 | End: 2019-02-18 | Stop reason: HOSPADM

## 2019-02-18 RX ORDER — LIDOCAINE HYDROCHLORIDE 10 MG/ML
1 INJECTION, SOLUTION EPIDURAL; INFILTRATION; INTRACAUDAL; PERINEURAL ONCE
Status: COMPLETED | OUTPATIENT
Start: 2019-02-18 | End: 2019-02-18

## 2019-02-18 RX ORDER — SODIUM CHLORIDE 0.9 % (FLUSH) 0.9 %
5 SYRINGE (ML) INJECTION
Status: DISCONTINUED | OUTPATIENT
Start: 2019-02-18 | End: 2019-02-18 | Stop reason: HOSPADM

## 2019-02-18 RX ORDER — DIPHENHYDRAMINE HYDROCHLORIDE 50 MG/ML
INJECTION INTRAMUSCULAR; INTRAVENOUS
Status: DISCONTINUED | OUTPATIENT
Start: 2019-02-18 | End: 2019-02-18 | Stop reason: HOSPADM

## 2019-02-18 NOTE — DISCHARGE INSTRUCTIONS
Anesthesia: Monitored Anesthesia Care (MAC)      What is monitored anesthesia care?  MAC keeps you very drowsy during surgery. You may be awake, but you will likely not remember much. And you wont feel pain. With MAC, medicines are given through an IV line into a vein in your arm or hand. A local anesthetic will usually be injected into the skin and muscle around the surgical site to numb it. The anesthesia provider monitors you during the procedure. He or she checks your heart rate and rhythm, blood pressure, and blood oxygen level.  Anesthesia tools and medicines that may be near you during your procedure  You will likely have:  · A pulse oximeter on the end of your finger. This measures your blood oxygen level.  · Electrocardiography leads (electrodes) on your chest. These record your heart rate and rhythm.  · Medicines given through an IV. These relax you and prevent pain. You may be awake or sleep lightly. If you have local anesthetic, it is injected directly into your skin.  · A facemask to give you oxygen, if needed.  Risks and possible complications  MAC has some risks. These include:  · Breathing problems  · Nausea and vomiting  · Allergic reaction to the anesthetic    Anesthesia safety  Tips for anesthesia safety include the following:   · Follow all instructions you are given for how long not to eat or drink before your procedure.  · Be sure your healthcare provider knows what medicines you take, especially any anti-inflammatory medicine or blood thinners. This includes aspirin and any other over-the-counter medicines, herbs, and supplements.  · Have an adult family member or friend drive you home after the procedure.  · For the first 24 hours after your surgery:  ¨ Do not drive or use heavy equipment.  ¨ Do not make important decisions or sign documents.  ¨ Avoid alcohol.  ¨ Have someone stay with you, if possible. They can watch for problems and help keep you safe.  Date Last Reviewed: 12/1/2016  ©  2850-8321 [employer]. [address] [place] [address], [place] [address]. All rights reserved. This information is not intended as a substitute for professional medical care. Always follow your healthcare professional's instructions.    FOLLOW ANY OTHER INSTRUCTIONS GIVEN TO YOU BY DR. BOYD!!!

## 2019-02-18 NOTE — BRIEF OP NOTE
[facility] Cath Lab [address]  Brief Operative Note     SUMMARY     Surgery Date: 2/18/2019     Surgeon(s) and Role:     * Ahsan Mendoza MD - Primary    Assisting Surgeon: Ernesto Colunga MD    Pre-op Diagnosis:  ESRD (end stage renal disease) [N18.6]  Problem with dialysis access [T82.898A]    Post-op Diagnosis:  Post-Op Diagnosis Codes:     * ESRD (end stage renal disease) [N18.6]     * Problem with dialysis access [T82.898A]    Procedure(s) (LRB):  Fistulogram (Right)    Anesthesia: 1% lidocaine    Description of the findings of the procedure: Patient was prepped and draped in a sterile fashion.  This was a successful fistulogram of his brachiocephalic AV fistula with no interventions performed.  Patient tolerated the procedure well and no immediate complications noted.      Findings/Key Components: Patient was prepped and draped in a sterile fashion.  This was a successful fistulogram of his brachiocephalic AV fistula with no interventions performed.  Patient tolerated the procedure well and no immediate complications noted.      Estimated Blood Loss: < 10 mL         Specimens:   Specimen (12h ago, onward)    None          Discharge Note    SUMMARY     Admit Date: 2/18/2019    Discharge Date and Time:  02/18/2019 11:45 AM    Hospital Course (synopsis of major diagnoses, care, treatment, and services provided during the course of the hospital stay): Patient was prepped and draped in a sterile fashion.  This was a successful fistulogram of his brachiocephalic AV fistula with no interventions performed.  Patient tolerated the procedure well and no immediate complications noted.       Final Diagnosis: Post-Op Diagnosis Codes:     * ESRD (end stage renal disease) [N18.6]     * Problem with dialysis access [T82.898A]    Disposition: Home or Self Care    Follow Up/Patient Instructions:     Medications:  Reconciled Home Medications:      Medication List      CONTINUE taking these medications    amiodarone 200  MG Tab  Commonly known as:  PACERONE  Take 200 mg by mouth 2 (two) times daily.     aspirin 81 MG EC tablet  Commonly known as:  ECOTRIN  Take 81 mg by mouth once daily.     coenzyme Q10 300 mg Cap  Take 300 mg by mouth once daily.     furosemide 40 MG tablet  Commonly known as:  LASIX  Take 40 mg by mouth. 4 days a week.     glucosamine-chondroitin 500-400 mg tablet  Take 2 tablets by mouth once daily.     hydroCHLOROthiazide 25 MG tablet  Commonly known as:  HYDRODIURIL  TAKE 1 TABLET BY MOUTH AS DIRECTED     insulin glargine 100 unit/mL injection  Commonly known as:  LANTUS  Inject 25 Units into the skin every evening.     omega-3 acid ethyl esters 1 gram capsule  Commonly known as:  LOVAZA  Take 1 g by mouth once daily.     paricalcitol 1 MCG capsule  Commonly known as:  ZEMPLAR  Take 1 mcg by mouth once daily.     * rOPINIRole 0.25 MG tablet  Commonly known as:  REQUIP  Take 0.5 mg by mouth once daily.     * rOPINIRole 2 MG tablet  Commonly known as:  REQUIP  Take 2 mg by mouth 2 (two) times daily.     sevelamer carbonate 800 mg Tab  Commonly known as:  RENVELA  Take 1,600 mg by mouth 4 (four) times daily.     valsartan 80 MG tablet  Commonly known as:  DIOVAN  Take 80 mg by mouth once daily.         * This list has 2 medication(s) that are the same as other medications prescribed for you. Read the directions carefully, and ask your doctor or other care provider to review them with you.              Discharge Procedure Orders   Lifting restrictions   Order Comments: No heavy lifting for 24 hours.     Call MD for:  temperature >100.4     Call MD for:  severe uncontrolled pain     Call MD for:  redness, tenderness, or signs of infection (pain, swelling, redness, odor or green/yellow discharge around incision site)     Call MD for:   Order Comments: Bleeding from the access site.     Activity as tolerated     Follow-up Information     [facility] In 1 day.    Why:  for dialysis  Contact  information:  [address]  [facility] [address]  [phone]

## 2019-02-18 NOTE — PLAN OF CARE
Gilson Owens has met all discharge criteria from Phase II. Vital Signs are stable, ambulating  without difficulty.Pain is now under control and tolerable for the pt. Pain score 3 at this time.  Discharge instructions given, patient verbalized understanding. Discharged from facility via wheelchair in stable condition.

## 2019-02-18 NOTE — PROCEDURES
"[facility] Interventional Nephrology Service    Fistulogram Procedure Report    Date of Procedure:  02/18/2019 11:46 AM    Procedures Performed: Fistulogram with no intervention, reflux arteriogram    Indication: "no flow" felt at dialysis unit    Referring Provider: Dr. Jarquin and [facility]    Primary Surgeon: Ahsan Mendoza MD  Assist: Ernesto Colunga MD    Medications Administered:  25 mg IV benadryl, 1% lidocaine    Procedure Report in Detail:  After informed consent was obtained the patient was prepped and draped in the usual sterile fashion.  His right upper Bracheocephalic AVF was cannulated in the venous facing direction with a micropuncture system, confirmed in correct placement under fluroscopy, and a fistulogram was performed using iodinated contrast.  Fistulogram revealed patent fistula with good caliber and flow, with no focal stenoses within the fistula, and no evidence of active extravasation into a hematoma within the cannulation zone.  Central vasculature was then evaluated with contrasted film revealing patent central vasculature (possibly a mild, < 50%, stenosis near the junction of the R subclavian and SVC).  Reflux arteriogram revealed mild (50%) stenosis in the JA region (but ultrasound done prior measured this at 5 mm in diameter).  More than 6cm of the brachial artery was visualized and it was patent.  At this point, since the patient had had prior ecchymoses, good flow of contrast, no significant focal stenoses and no areas of hematoma/extravasation, we opted for no interventions at this time.  The microsheath was pulled, and direct pressure was used to achieve hemostasis.  No complications occurred during the procedure.     Estimated blood loss: < 10 mL   Estimated contrast used: 12 mL    Impression/Plan:  This was a successful fistulogram with no interventions performed.  We will have the patient return in 1 week for follow up exam and ultrasound.  Please do refer the patient back " if there are any further signs of stenosis (increased venous pressure alarms, prolonged bleeding or worsening adequacy).      Ahsan Mendoza MD  [phone]

## 2019-12-13 ENCOUNTER — HOSPITAL ENCOUNTER (EMERGENCY)
Facility: OTHER | Age: 74
Discharge: HOME | End: 2019-12-13
Attending: EMERGENCY MEDICINE
Payer: MEDICARE

## 2019-12-13 VITALS
SYSTOLIC BLOOD PRESSURE: 110 MMHG | HEART RATE: 71 BPM | RESPIRATION RATE: 18 BRPM | OXYGEN SATURATION: 100 % | DIASTOLIC BLOOD PRESSURE: 58 MMHG | TEMPERATURE: 99 F

## 2019-12-13 DIAGNOSIS — T82.9XXA COMPLICATION FROM RENAL DIALYSIS DEVICE, INITIAL ENCOUNTER: Primary | ICD-10-CM

## 2019-12-13 DIAGNOSIS — T82.838A BLEEDING FROM DIALYSIS SHUNT, INITIAL ENCOUNTER: ICD-10-CM

## 2019-12-13 PROCEDURE — 99281 EMR DPT VST MAYX REQ PHY/QHP: CPT

## 2019-12-13 RX ORDER — VALSARTAN 80 MG/1
80 TABLET ORAL DAILY
COMMUNITY

## 2019-12-13 RX ORDER — ROPINIROLE 1 MG/1
1 TABLET, FILM COATED ORAL 3 TIMES DAILY
COMMUNITY

## 2019-12-13 RX ORDER — SEVELAMER HYDROCHLORIDE 800 MG/1
800 TABLET, FILM COATED ORAL
COMMUNITY

## 2019-12-13 RX ORDER — ESZOPICLONE 2 MG/1
2 TABLET, FILM COATED ORAL NIGHTLY
COMMUNITY

## 2019-12-13 RX ORDER — ASPIRIN 81 MG/1
81 TABLET ORAL DAILY
COMMUNITY

## 2019-12-13 RX ORDER — CINACALCET 30 MG/1
30 TABLET, FILM COATED ORAL
COMMUNITY

## 2019-12-13 RX ORDER — CLOPIDOGREL BISULFATE 75 MG/1
75 TABLET ORAL DAILY
COMMUNITY

## 2019-12-13 NOTE — ED PROVIDER NOTES
Encounter Date: 12/13/2019    SCRIBE #1 NOTE: I, Irasema Harsh, am scribing for, and in the presence of, Dr. Santiago.       History     Chief Complaint   Patient presents with    Vascular Access Problem     pt reports finished tx of dialysis yesterday, last night when  to take the bandage off and site started bleeding. pt went to imaging center to see if they can stop the bleeding. pressure applied to site. pt was rapid response. pt denies bleeding at site in the past      Time seen by provider: 1:04 PM    This is a 74 y.o. male who presents with complaint of blood leaking from fistula in RUE after removing bandage from dialysis at 10 PM yesterday. He states that he has been applying pressure to site but bleeding has not stopped since onset. He normally dialyzes in the morning and takes Plavix at night. He has been on dialysis for 1.5 years. He denies any prior issue with fistula bleeding. He states that Dr. Eliseo Kwan with [facility] placed the fistula. He denies any recent medication changes. He takes Aspirin daily. He has no other complaints at the time.    The history is provided by the patient.     Review of patient's allergies indicates:   Allergen Reactions    Shrimp      No past medical history on file.  No past surgical history on file.  No family history on file.  Social History     Tobacco Use    Smoking status: Not on file   Substance Use Topics    Alcohol use: Not on file    Drug use: Not on file     Review of Systems   Constitutional: Negative for chills and fever.   HENT: Negative for congestion and sore throat.    Eyes: Negative for redness.   Respiratory: Negative for cough and shortness of breath.    Cardiovascular: Negative for chest pain.   Gastrointestinal: Negative for nausea.   Musculoskeletal: Negative for back pain.   Skin: Negative for rash.        Notes bleeding from RUE fistula.   Neurological: Negative for weakness, light-headedness and headaches.   Psychiatric/Behavioral: Negative for  confusion.       Physical Exam     Initial Vitals   BP Pulse Resp Temp SpO2   12/13/19 1151 12/13/19 1149 -- -- 12/13/19 1149   (!) 114/56 74   99 %      MAP       --                Physical Exam    Nursing note and vitals reviewed.  Constitutional: He appears well-developed and well-nourished. He is not diaphoretic. No distress.   HENT:   Head: Normocephalic and atraumatic.   Mouth/Throat: Oropharynx is clear and moist.   Moist mucous membranes.   Eyes: Conjunctivae and EOM are normal. Pupils are equal, round, and reactive to light.   Conjunctivae pink, clear, and intact.    Neck: Normal range of motion. Neck supple.   No cervical lymphadenopathy.    Cardiovascular: Normal rate, regular rhythm, S1 normal, S2 normal and normal heart sounds. Exam reveals no gallop and no friction rub.    No murmur heard.  Pulses:       Radial pulses are 2+ on the right side.   Pulmonary/Chest: Breath sounds normal. No respiratory distress. He has no wheezes. He has no rhonchi. He has no rales.   Lungs clear to auscultation bilaterally.    Abdominal:   No audible bruits.    Musculoskeletal: Normal range of motion. He exhibits no edema or tenderness.   No lower extremity edema.    Lymphadenopathy:     He has no cervical adenopathy.   Neurological: He is alert and oriented to person, place, and time.   RUE: Strength 5/5. Sensations intact to light touch.   Skin: Skin is warm and dry. Capillary refill takes less than 2 seconds. No rash noted. No pallor.   Fistula to RUE with pinpoint bleeding.  Warm and dry. No skin tenting, rashes, or lesions.   Psychiatric: He has a normal mood and affect. His behavior is normal. Judgment and thought content normal.         ED Course   Procedures  Labs Reviewed - No data to display          Medical Decision Making:   History:   Old Medical Records: I decided to obtain old medical records.            Scribe Attestation:   Scribe #1: I performed the above scribed service and the documentation accurately  describes the services I performed. I attest to the accuracy of the note.    Attending Attestation:           Physician Attestation for Scribe:  Physician Attestation Statement for Scribe #1: I, Dr. Myles, reviewed documentation, as scribed by Irasema House in my presence, and it is both accurate and complete.         Attending ED Notes:   Emergent evaluation 74-year-old male with chief complaint of bleeding from his dialysis graft site.  Patient is afebrile, nontoxic-appearing stable vital signs.  No active hemorrhage. Patient has pinpoint bleeding at the area of dialysis access.  Compression is held over bandage with dialysis clip.  On re-evaluation patient had persistent oozing from the site.  Surgeon Seals placed over the area along with repeat compression.  Final disposition and plan of care will go to Dr. Mesa.    The patient is extensively counseled on his diagnosis and treatment, discharged good condition and directed to follow up with his PCP in the next 24-48 hours.          ED Course as of Dec 13 1517   Fri Dec 13, 2019   1312 Bandage was changed and patient now has clamp over the right arm with 2x2. No hemorrhage.    [SF]      ED Course User Index  [SF] Irasema House                Clinical Impression:   No diagnosis found.                            Garfield Myles MD  12/13/19 1519

## 2019-12-13 NOTE — ED NOTES
Two patient Identifiers for Ayaan Barone checked and correct    Pt presented to the dialysis clinic with a bleeding fistula. Pt states dialysis yesterday and fistula was oozing after, continued to bleed at site through to today when he presented to dialysis clinic. Thrill felt at fistula site, wound remains wrapped for physician inspection.      LOC/Affect: Pt A&Ox4. Pt affect is calm.   Appearance: Pt in no acute distress at present time. Pt is clean and well groomed.  Skin: Skin warm, dry & intact. Color consistent with ethnicity. Mucous membranes moist. No breakdown or brusing noted.   Musculoskeletal: Patient ROM intact MAEW, no obvious swelling or deformities noted.   Respiratory: Respirations spontaneous, even, and non-labored. Visible chest rise noted. Airway is open and patent. No accessory muscle use noted. Breath sounds CTA.   Cardiac: All peripheral pulses present. No Bilateral lower extremity edema. Cap refill is <3 seconds.   Neurologic: Pupils 3mm PERRL. Motor and sensation intact. Speech is clear and appropriate. Eyes open spontaneously, follows commands, facial expression symmetrical.  Abdomen: Abdomen rounded, non-tender to palpation. No distention noted. ABS all quads.   : Pt reports no dysuria or hematuria.     Will continue to monitor

## 2019-12-13 NOTE — ED NOTES
Dialysis clamp removed from right upper arm access, site with very slight ooze of blood, surgicele/telfa/gauze applied and firmly wrapped with ace wrap.  Radial pulse to right arm palpable, capillary refill 3-4 seconds.

## 2019-12-13 NOTE — DISCHARGE INSTRUCTIONS
If bleeding occurs, call 911 immediately and apply direct pressure or ask a friend or bystander to apply pressure.  Be cautious with your arm, avoid trauma.

## 2022-04-18 DIAGNOSIS — E78.5 HYPERLIPIDEMIA, UNSPECIFIED HYPERLIPIDEMIA TYPE: ICD-10-CM

## 2022-04-18 DIAGNOSIS — I48.0 PAROXYSMAL ATRIAL FIBRILLATION: Primary | ICD-10-CM

## 2022-04-18 DIAGNOSIS — I25.10 ATHEROSCLEROSIS OF NATIVE CORONARY ARTERY, UNSPECIFIED WHETHER ANGINA PRESENT, UNSPECIFIED WHETHER NATIVE OR TRANSPLANTED HEART: ICD-10-CM

## 2022-04-18 DIAGNOSIS — R06.02 SHORTNESS OF BREATH: ICD-10-CM

## 2022-04-18 DIAGNOSIS — I10 ESSENTIAL HYPERTENSION, MALIGNANT: ICD-10-CM

## 2022-04-21 DIAGNOSIS — I25.10 CORONARY ARTERY DISEASE, UNSPECIFIED VESSEL OR LESION TYPE, UNSPECIFIED WHETHER ANGINA PRESENT, UNSPECIFIED WHETHER NATIVE OR TRANSPLANTED HEART: ICD-10-CM

## 2022-04-21 DIAGNOSIS — R06.02 SHORTNESS OF BREATH: ICD-10-CM

## 2022-04-21 DIAGNOSIS — E78.5 HYPERLIPIDEMIA, UNSPECIFIED HYPERLIPIDEMIA TYPE: ICD-10-CM

## 2022-04-21 DIAGNOSIS — I48.0 PAF (PAROXYSMAL ATRIAL FIBRILLATION): Primary | ICD-10-CM

## 2022-04-21 DIAGNOSIS — I10 ESSENTIAL HYPERTENSION, MALIGNANT: ICD-10-CM

## 2022-04-27 ENCOUNTER — HOSPITAL ENCOUNTER (OUTPATIENT)
Dept: RADIOLOGY | Facility: OTHER | Age: 77
Discharge: HOME | End: 2022-04-27
Attending: INTERNAL MEDICINE
Payer: MEDICARE

## 2022-04-27 DIAGNOSIS — I10 ESSENTIAL HYPERTENSION, MALIGNANT: ICD-10-CM

## 2022-04-27 DIAGNOSIS — I48.0 PAF (PAROXYSMAL ATRIAL FIBRILLATION): ICD-10-CM

## 2022-04-27 DIAGNOSIS — I25.10 CORONARY ARTERY DISEASE, UNSPECIFIED VESSEL OR LESION TYPE, UNSPECIFIED WHETHER ANGINA PRESENT, UNSPECIFIED WHETHER NATIVE OR TRANSPLANTED HEART: ICD-10-CM

## 2022-04-27 DIAGNOSIS — E78.5 HYPERLIPIDEMIA, UNSPECIFIED HYPERLIPIDEMIA TYPE: ICD-10-CM

## 2022-04-27 DIAGNOSIS — R06.02 SHORTNESS OF BREATH: ICD-10-CM

## 2022-04-27 PROCEDURE — 71250 CT CHEST WITHOUT CONTRAST: ICD-10-PCS | Mod: 26,,, | Performed by: STUDENT IN AN ORGANIZED HEALTH CARE EDUCATION/TRAINING PROGRAM

## 2022-04-27 PROCEDURE — 71250 CT THORAX DX C-: CPT | Mod: TC

## 2022-04-27 PROCEDURE — 71250 CT THORAX DX C-: CPT | Mod: 26,,, | Performed by: STUDENT IN AN ORGANIZED HEALTH CARE EDUCATION/TRAINING PROGRAM

## 2022-05-02 ENCOUNTER — TELEPHONE (OUTPATIENT)
Dept: NEUROLOGY | Facility: CLINIC | Age: 77
End: 2022-05-02
Payer: MEDICARE

## 2022-06-20 DIAGNOSIS — R07.9 CHEST PAIN, UNSPECIFIED TYPE: Primary | ICD-10-CM

## 2025-06-19 NOTE — TELEPHONE ENCOUNTER
----- Message from Samantha Greco MA sent at 5/2/2022  9:17 AM CDT -----    ----- Message -----  From: Tyron Aranda  Sent: 5/2/2022   9:11 AM CDT  To: Balbir Gama Staff    Name of Who is Calling: KIRK FAY          What is the request in detail: The patient is calling to schedule an appointment. Please advise          Can the clinic reply by [facility]: No         What Number to Call Back if not in [facility]: [phone]      
Called pt to schedule neurology appt. Left vm with clinic number to return call to schedule  
Ambulatory

## 2025-07-24 NOTE — TELEPHONE ENCOUNTER
Reviewed pt transplant labs.  Notified dialysis unit dietitian of the following abnormal labs (1/16/19) via fax.   Alb 3.2  Phos 6.6   No

## (undated) DEVICE — GLOVE PROTEXIS PI CLASSIC 7.0

## (undated) DEVICE — SEE MEDLINE ITEM 156918

## (undated) DEVICE — SET MICPUNC ACC STIFF CANNULA

## (undated) DEVICE — GLOVE PROTEXIS PI CLASSIC 8.0

## (undated) DEVICE — GOWN SMART IMP BREATHABLE XXLG

## (undated) DEVICE — GLOVE PROTEXIS PI CLASSIC 7.5

## (undated) DEVICE — FLOWSWITCH HP 1-W W/O LL

## (undated) DEVICE — CHLORAPREP 10.5 ML APPLICATOR